# Patient Record
Sex: MALE
[De-identification: names, ages, dates, MRNs, and addresses within clinical notes are randomized per-mention and may not be internally consistent; named-entity substitution may affect disease eponyms.]

---

## 2019-09-10 ENCOUNTER — HOSPITAL ENCOUNTER (EMERGENCY)
Dept: HOSPITAL 88 - FSED | Age: 44
LOS: 1 days | Discharge: HOME | End: 2019-09-11
Payer: COMMERCIAL

## 2019-09-10 VITALS — WEIGHT: 190 LBS | BODY MASS INDEX: 28.79 KG/M2 | HEIGHT: 68 IN

## 2019-09-10 DIAGNOSIS — T78.40XA: Primary | ICD-10-CM

## 2019-09-10 DIAGNOSIS — K21.9: ICD-10-CM

## 2019-09-10 DIAGNOSIS — J38.4: ICD-10-CM

## 2019-09-10 DIAGNOSIS — J98.01: ICD-10-CM

## 2019-09-10 PROCEDURE — 99282 EMERGENCY DEPT VISIT SF MDM: CPT

## 2019-09-11 VITALS — DIASTOLIC BLOOD PRESSURE: 75 MMHG | SYSTOLIC BLOOD PRESSURE: 142 MMHG

## 2020-09-26 ENCOUNTER — HOSPITAL ENCOUNTER (EMERGENCY)
Dept: HOSPITAL 88 - FSED | Age: 45
Discharge: HOME | End: 2020-09-26
Payer: COMMERCIAL

## 2020-09-26 VITALS — WEIGHT: 185 LBS | HEIGHT: 68 IN | BODY MASS INDEX: 28.04 KG/M2

## 2020-09-26 DIAGNOSIS — T15.91XA: ICD-10-CM
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
## 2020-09-26 DIAGNOSIS — S05.01XA: ICD-10-CM

## 2020-09-26 DIAGNOSIS — S05.02XA: Primary | ICD-10-CM

## 2020-09-26 DIAGNOSIS — T15.92XA: ICD-10-CM

## 2020-09-26 PROCEDURE — 99283 EMERGENCY DEPT VISIT LOW MDM: CPT

## 2020-09-26 NOTE — EMERGENCY DEPARTMENT NOTE
History of Present Illnes


History of Present Illness


Chief Complaint:  Eye, Ear, Nose, Throat, Dental


History of Present Illness


This is a 45 year old  male c/o both eye irritation at work, feeling 

like some sands flying into his eye.  He went home took shower and let th water 

soaked into his eyes but the pain got worse, more on the right side, pain, 

teary, swelling .


Historian:  Patient


Arrival Mode:  Car


 Required:  No


Onset (how long ago):  hour(s)


Radiation:  Reports non-radiation


Severity:  moderate


Onset quality:  gradual


Timing of current episode:  constant


Progression:  worsening


Relieving factors:  none


Exacerbating factors:  none


Associated symptoms:  Reports denies other symptoms


Treatments prior to arrival:  none





Past Medical/Family History


Physician Review


I have reviewed the patient's past medical and family history.  Any updates have

been documented here.





Past Medical History


Recent Fever:  No


Clinical Suspicion of Infectio:  No


New/Unexplained Change in Ment:  No


Past Medical History:  None


Past Surgical History:  None





Social History


Smoking Cessation:  Never Smoker


Counseling Performed:  No


Alcohol Use:  None


Any Illegal Drug Use:  No


Physically hurt or threatened:  No





Other


Last Tetanus:  UTD


Any Pre-Existing Lines (PICC,:  No





Review of Systems


Review of Systems


Constitutional:  Reports no symptoms


EENTM:  Reports as per HPI, Reports eye pain (visual acuity: 20/40 OS, 20/40 OD,

), Reports tearing (Visual acuity: 20/40 OS, 20/40 OD, 20/30 OU), Reports other 

(wearing reading glasses but can drive without glasses)


Respiratory:  Reports no symptoms


Gastrointestinal:  Reports no symptoms


Genitourinary:  Reports no symptoms


Musculoskeletal:  Reports no symptoms


Integumentary:  Reports no symptoms


Neurological:  Reports no symptoms


Psychological:  Reports no symptoms


Endocrine:  Reports no symptoms


Hematological/Lymphatic:  Reports no symptoms





Physical Exam


Related Data


Allergies:  


Coded Allergies:  


     No Known Allergies (Unverified , 9/26/20)


Triage Vital Signs





Vital Signs








  Date Time  Temp Pulse Resp B/P (MAP) Pulse Ox O2 Delivery O2 Flow Rate FiO2


 


9/26/20 01:31 98.2 70 18 165/85 100   











Physical Exam


CONSTITUTIONAL





Constitutional:  Present well-developed, Present well-nourished


HENT


HENT:  Present normocephalic, Present atraumatic, Present oropharynx 

clear/moist, Present nose normal, Present other (Visual acuity: 20/40 OS, 20/40 

OD, 20/30 OU)


HENT L/R:  Present left ext ear normal, Present right ext ear normal


EYES





Eyes:  Reports PERRL, Reports conjunctivae normal


NECK


Neck:  Present ROM normal


PULMONARY


Pulmonary:  Present effort normal, Present breath sounds normal


CARDIOVASCULAR





Cardiovascular:  Present regular rhythm, Present heart sounds normal, Present 

capillary refill normal, Present normal rate


GASTROINTESTINAL





Abdominal:  Present soft, Present nontender, Present bowel sounds normal


GENITOURINARY





Genitourinary:  Present exam deferred


SKIN


Skin:  Present warm, Present dry


MUSCULOSKELETAL





Musculoskeletal:  Present ROM normal


NEUROLOGICAL





Neurological:  Present alert, Present oriented x 3, Present no gross motor or 

sensory deficits


PSYCHOLOGICAL


Psychological:  Present mood/affect normal, Present judgement normal





Procedures


Foreign Body - Eye


Time out performed:  Yes


Location:  right eye


Topical anesthetic:  tetracaine


Foreign body:  other (none found)


Evidence of corneal penetratio:  No


Post-procedure medication:  ophthalmic antibiotic


Patient tolerated procedure:  well


Additional comments


Fluorescein stained: right eye 0.5x 2cm abrasion, left small abrasion





Assessment & Plan


Medical Decision Making


MDM


conjunctivitis, corneal abrasion vs ulcer.





Reassessment


Reassessment time:  01:57


Reassessment


more comfortable after local anesthetic, abx and eye patches .





Assessment & Plan


Final Impression:  


(1) Conjunctivitis


(2) Corneal abrasion of both eyes


Depart Disposition:  HOME, SELF-CARE


Last Vital Signs











  Date Time  Temp Pulse Resp B/P (MAP) Pulse Ox O2 Delivery O2 Flow Rate FiO2


 


9/26/20 01:31 98.2 70 18 165/85 100   








Home Meds


Active Scripts


Ranitidine Hcl (RANITIDINE HCL) 150 Mg Tablet, 1 TAB PO BID, #60 TAB 0 Refills


   Prov:BYRON MANZANARES MD         9/11/19


[epipen]   No Conflict Check, 0.3 MG IM PRN for allergic reaction, #2 PKG 0 

Refills


   May repeat in 5-15 minutes x 1, if needed. Call 911, after use.


   Prov:BYRON MANZANARES MD         9/11/19


Prednisone (PREDNISONE) 20 Mg Tab, 20 MG PO BID for for rash for 7 Days, #14 TAB

0 Refills


   Prov:BYRON MANZANARES MD         9/11/19


Medications in the ED





Tetracaine HCl 4 ml STK-MED ONCE .ROUTE ;  Start 9/26/20 at 01:37;  Stop 9/26/20

at 01:33;  Status DC


Fluorescein Sodium 2 mg STK-MED ONCE .ROUTE ;  Start 9/26/20 at 01:37;  Stop 

9/26/20 at 01:33;  Status DC


Erythromycin 3.5 gm STK-MED ONCE OP ;  Start 9/26/20 at 01:43;  Stop 9/26/20 at 

01:38;  Status DC


Tetracaine HCl  ONCE  ONCE OP  Last administered on 9/26/20at 01:52; Admin Dose 

1 ML;  Start 9/26/20 at 01:45;  Stop 9/26/20 at 01:52;  Status DC


Erythromycin  ONCE  ONCE OP  Last administered on 9/26/20at 01:53; Admin Dose 10

MG;  Start 9/26/20 at 01:45;  Stop 9/26/20 at 01:52;  Status DC


Fluorescein Sodium 1 mg ONCE  ONCE OP  Last administered on 9/26/20at 01:53; 

Admin Dose 1 MG;  Start 9/26/20 at 01:45;  Stop 9/26/20 at 01:52;  Status DC





Physician Attestation


Provider Attestation


Erythromycin ointment, OTC Tylenol, Benadryl, ibuprofen, f/u with his eye doctor

next week











KWABENA BRADFORD MD                Sep 26, 2020 02:11

## 2020-09-27 NOTE — XMS REPORT
Continuity of Care Document

                             Created on: 2020



OH COLLIER

External Reference #: [id number]

: 1975

Sex: Male



Demographics





                          Address                   [address] [place]  [address]

 

                          Home Phone                [phone]

 

                          Preferred Language        Unknown

 

                          Marital Status            Unknown

 

                          Jain Affiliation     Unknown

 

                          Race                      Unknown

 

                          Additional Race(s)        Other



 

                          Ethnic Group              Unknown





Author





                          Author                    [facility]

t

 

                          Organization              [facility]

 

                          Address                   [address]

[place]  [address]



 

                          Phone                     Unavailable







Support





                Name            Relationship    Address         Phone

 

                    DEMETRI COLLIER  EC[employer]                [address] [place]  [address]                      Unavailable







Care Team Providers





                    Care Team Member Name Role                Phone

 

                    DARRICK BAH M.D. PCP                 [phone]







Payers





           Payer Name Policy Type Policy Number Effective Date Expiration Date [employer] shelley

 

           [facility]             [id number] 2016 00:00:00            [facility]

 

           [employer] o             [id number] 2019 00:00:00            [facility]







Problems





           Condition Name Condition Details Condition Category Status     Onset 

Date Resolution

Date            Last Treatment Date Treating Clinician Comments        Source

 

       Problem        Condition Active                                    [facility]







Allergies, Adverse Reactions, Alerts

This patient has no known allergies or adverse reactions.



Social History





           Social Habit Start Date Stop Date  Quantity   Comments   Source

 

           Sex Assigned At Birth 1975 00:00:00 1975 00:[birth year]:00 Male   

               [facility]







Medications





             Ordered Medication Name Filled Medication Name Start Date   Stop Da

te    Current 

Medication? Ordering Clinician Indication Dosage     Frequency  Signature (SIG) 

Comments                  Components                Source

 

     Ranitidine Hcl Ranitidine Hcl 2019 00:38:00      Yes            1    

     Twice A Day           

[facility]

 

       Epipen Epipen 2019 00:36:00        Yes                  .3         

   as needed for Allergic Reaction

                                                            [facility]

 

       Prednisone Prednisone 2019 00:32:00        Yes                  20 

           Twice A Day for For 

Rash                                                        [facility]







Vital Signs





             Vital Name   Observation Time Observation Value Comments     Source

 

             Weight       2020 01:31:00 185 [lb_av]               [facility]

 

             BMI (Body Mass Index) 2020 01:31:00 28.1 kg/m2               

 [facility]







Procedures

This patient has no known procedures.



Plan of Care





             Planned Activity Planned Date Details      Comments     Source

 

             Instructions              Corneal Abrasion              [facility]







Encounters





             Start Date/Time End Date/Time Encounter Type Admission Type Attendi

[facility]   Care Department Encounter ID    Source

 

          2020 01:30:00 2020 02:04:00 Departed Emergency Room       

              [facility] [id number]              [facility]

 

          2019-09-10 21:20:00 2019 00:52:00 Departed Emergency Room       

              [facility]                    [id number]              [facility]







Results

This patient has no known results.